# Patient Record
Sex: FEMALE | Race: WHITE | NOT HISPANIC OR LATINO | Employment: STUDENT
[De-identification: names, ages, dates, MRNs, and addresses within clinical notes are randomized per-mention and may not be internally consistent; named-entity substitution may affect disease eponyms.]

---

## 2023-06-04 ENCOUNTER — HEALTH MAINTENANCE LETTER (OUTPATIENT)
Age: 33
End: 2023-06-04

## 2023-06-30 ENCOUNTER — TELEPHONE (OUTPATIENT)
Dept: NURSING | Facility: CLINIC | Age: 33
End: 2023-06-30

## 2023-06-30 NOTE — TELEPHONE ENCOUNTER
Telephone Call for appt    Pt calling to report that she thinks she has a UTI and is asking how she can get an order for an antibiotic. Pt was given appt options for today. Pt declined triage of sx at this time.     Pt was given the [place] [facility] [place] and then was transferred to scheduling to check yeny availability.     Ayla Gunn RN  [facility] Nurse Advisor 1:14 PM 6/30/2023

## 2023-07-03 ENCOUNTER — VIRTUAL VISIT (OUTPATIENT)
Dept: FAMILY MEDICINE | Facility: CLINIC | Age: 33
End: 2023-07-03
Payer: COMMERCIAL

## 2023-07-03 DIAGNOSIS — N39.0 FREQUENT UTI: Primary | ICD-10-CM

## 2023-07-03 DIAGNOSIS — R30.0 DYSURIA: ICD-10-CM

## 2023-07-03 PROCEDURE — 99203 OFFICE O/P NEW LOW 30 MIN: CPT | Mod: VID | Performed by: INTERNAL MEDICINE

## 2023-07-03 RX ORDER — VITAMIN B COMPLEX
1000 TABLET ORAL DAILY
COMMUNITY

## 2023-07-03 RX ORDER — CIPROFLOXACIN 500 MG/1
500 TABLET, FILM COATED ORAL 2 TIMES DAILY
Qty: 10 TABLET | Refills: 1 | Status: SHIPPED | OUTPATIENT
Start: 2023-07-03 | End: 2023-07-08

## 2023-07-03 NOTE — PROGRESS NOTES
Mary Jane is a 32 year old who is being evaluated via a billable video visit.      How would you like to obtain your AVS? MyChart  If the video visit is dropped, the invitation should be resent by: Text to cell phone: [phone]  Will anyone else be joining your video visit? No      Subjective   Mary Jane is a 32 year old, presenting for the following health issues:  Urinary Problem (For 3 days. Last Menstrual Period 06/20/2023) and Health Maintenance (Last Pap Smear done on 05/03/2021 at [facility])      HPI     Chief Complaint:     UTI    HPI:   Patient Kathryn Tarango is a very pleasant 32 year old female with history of frequent UTI's who presents to Internal Medicine clinic today for evaluation recent recurrent dysuria, UTI symptoms.         Current Medications:     Current Outpatient Medications   Medication Sig Dispense Refill     ciprofloxacin (CIPRO) 500 MG tablet Take 1 tablet (500 mg) by mouth 2 times daily for 5 days 10 tablet 1     Vitamin D3 (CHOLECALCIFEROL) 25 mcg (1000 units) tablet Take 1,000 Units by mouth daily (Patient not taking: Reported on 7/3/2023)           Allergies:      Allergies   Allergen Reactions     No Known Allergies             Past Medical History:   No past medical history on file.      Past Surgical History:   No past surgical history on file.      Family Medical History:   No family history on file.      Social History:     Social History     Socioeconomic History     Marital status:      Spouse name: Not on file     Number of children: Not on file     Years of education: Not on file     Highest education level: Not on file   Occupational History     Not on file   Tobacco Use     Smoking status: Never     Smokeless tobacco: Never   Vaping Use     Vaping Use: Never used   Substance and Sexual Activity     Alcohol use: Yes     Comment: occ     Drug use: Never     Sexual activity: Yes     Partners: Male   Other Topics Concern     Not on file   Social  History Narrative     Not on file     Social Determinants of Health     Financial Resource Strain: Not on file   Food Insecurity: Not on file   Transportation Needs: Not on file   Physical Activity: Not on file   Stress: Not on file   Social Connections: Not on file   Intimate Partner Violence: Not on file   Housing Stability: Not on file           Review of System:     Constitutional: Negative for fever or chills  Skin: Negative for rashes  Ears/Nose/Throat: Negative for nasal congestion, sore throat  Respiratory: No shortness of breath, dyspnea on exertion, cough, or hemoptysis  Cardiovascular: Negative for chest pain  Gastrointestinal: Negative for nausea, vomiting  Genitourinary: positive for dysuria  Musculoskeletal: Negative for myalgias  Neurologic: Negative for headaches  Psychiatric: Negative for depression, anxiety  Hematologic/Lymphatic/Immunologic: Negative  Endocrine: Negative  Behavioral: Negative for tobacco use       Physical Exam:   There were no vitals taken for this visit.    GENERAL: alert and no distress  EYES: eyes grossly normal to inspection  HENT: Normocephalic atraumatic.   RESP: no cough present  CV: patient appears to be hemodynamically stable  MS: no gross musculoskeletal defects noted  SKIN: no visible suspicious lesions or rashes  NEURO: Alert & Oriented x 3.   PSYCH: mentation appears normal, affect normal        Diagnostic Test Results:     Diagnostic Test Results:  Labs reviewed in Epic    ASSESSMENT/PLAN:       Kathryn was seen today for urinary problem and health maintenance.    Diagnoses and all orders for this visit:    Frequent UTI  -     REVIEW OF HEALTH MAINTENANCE PROTOCOL ORDERS  -     ciprofloxacin (CIPRO) 500 MG tablet; Take 1 tablet (500 mg) by mouth 2 times daily for 5 days    Dysuria            Follow Up Plan:     Patient is instructed to return to Internal Medicine clinic for follow-up visit in 1 week.        Zoey Chu MD  Internal Medicine  [facility]  [facility]        Video-Visit Details    Type of service:  Video Visit     Originating Location (pt. Location): Home  Distant Location (provider location):  Off-site  Platform used for Video Visit: Yancy

## 2023-07-20 ENCOUNTER — THERAPY VISIT (OUTPATIENT)
Dept: PHYSICAL THERAPY | Facility: CLINIC | Age: 33
End: 2023-07-20
Payer: COMMERCIAL

## 2023-07-20 DIAGNOSIS — M54.50 BILATERAL LOW BACK PAIN WITHOUT SCIATICA: ICD-10-CM

## 2023-07-20 DIAGNOSIS — M62.08 DIASTASIS OF RECTUS ABDOMINIS: Primary | ICD-10-CM

## 2023-07-20 PROCEDURE — 97161 PT EVAL LOW COMPLEX 20 MIN: CPT | Mod: GP

## 2023-07-20 PROCEDURE — 97530 THERAPEUTIC ACTIVITIES: CPT | Mod: GP

## 2023-07-20 PROCEDURE — 97110 THERAPEUTIC EXERCISES: CPT | Mod: GP

## 2023-07-20 NOTE — PROGRESS NOTES
PHYSICAL THERAPY EVALUATION  Type of Visit: Evaluation    See electronic medical record for Abuse and Falls Screening details.    Mary Jane broussard knows she has MARIBEL but has never sought formal help for it. She has tried core repair program through [employer] for a while 1 yr ago- felt stronger but still had separation. Has had LBP in the past but not currently. Notes feelings of pressure in lower abdomen, nito at end of the day.  A couple times a wk is doing some core exercises- abdominal bracing, supine leg lifts, bird dogs, etc. Avoiding crunches. Would like to be able to do more core exercises confidently so she can participate in pilates and barre classes more    Subjective       Presenting condition or subjective complaint: diastasis recti, pain in lower abdomen/pelvic area, weakness  Date of onset: 12/12/21    Relevant medical history:     Dates & types of surgery: two c-sections (6/13/19, 12/12/21)    Prior diagnostic imaging/testing results:       Prior therapy history for the same diagnosis, illness or injury: No        Living Environment  Social support: With a significant other or spouse   Type of home: House   Stairs to enter the home: Yes 2 Is there a railing: No   Ramp: No   Stairs inside the home: Yes 16 Is there a railing: Yes   Help at home: None  Equipment owned:       Employment: Yes PhD student/  Hobbies/Interests: dance, yoga, reading, cooking    Patient goals for therapy: move more confidently, snowboard, certain workouts, lift my kids without lingering pain    Pain assessment: Pain denied     Objective      PELVIC EVALUATION  ADDITIONAL HISTORY:  Sex assigned at birth: Female  Gender identity: Female    Pronouns: She/Her Hers      Bladder History:  Feels bladder filling: No  Triggers for feeling of inability to wait to go to the bathroom: No    How long can you wait to urinate: a few minutes  Gets up at night to urinate: No    Can stop the flow of urine when urinating:  Yes  Volume of urine usually released: Medium   Other issues:    Number of bladder infections in last 12 months:    Fluid intake per day: 50 12 6  Medications taken for bladder: No     Activities causing urine leak: Hurrying to the bathroom due to a strong urge to urinate (pee)    Amount of urine typically leaked: minimal  Pads used to help with leaking: No        Bowel History:  Frequency of bowel movement: daily  Consistency of stool: Soft-formed    Ignores the urge to defecate: No  Other bowel issues:    Length of time spent trying to have a bowel movement:      Sexual Function History:  Sexual orientation: Straight    Sexually active: Yes  Lubrication used: Yes Yes  Pelvic pain: Initial penetration (rectal or vaginal)    Pain or difficulty with orgasms/erection/ejaculation: No    State of menopause: Perimenopause (have not gone through menopause yet)  Hormone medications: No      Are you currently pregnant: No, Number of previous pregnancies: 2, Number of deliveries: 2, If you have delivered before, did you have any of these issues during delivery:  delivery, Have you been diagnosed with pelvic prolapse or abdominal separation: Yes, Do you get regular exercise: Yes, I do this type of exercise: dance, barre, yoga, Have you tried pelvic floor strengthening exercises for 4 weeks: Yes, Do you have any history of trauma that is relevant to your care that you d like to share: No    Discussed reason for referral regarding pelvic health needs and external/internal pelvic floor muscle examination with patient/guardian.  Opportunity provided to ask questions and verbal consent for assessment and intervention was given.    PAIN: Pain Location: lower anterior abdomen  Pain Quality: Aching  Pain is Worst: end of day after doing a lot  POSTURE: Standing Posture: Sway back  LUMBAR SCREEN: AROM WNL  HIP SCREEN:  Strength:   Pain: - none + mild ++ moderate +++ severe  Strength Scale: 0-5/5 Left Right   Hip Flexion 4 4    Hip Extension 4 4   Hip Abduction 4 4   Hip Adduction     Hip Internal Rotation 4 4   Hip External Rotation 4 4   Functional Strength Testing: Double Leg Squat: Good technique/no significant findings  Single Leg Squat: Anterior knee translation, Knee valgus, Hip internal rotation and Improper use of glutes/hips    PELVIC/SI SCREEN:  WNL   BREATHING SYMMETRY: upper chest breathing pattern w/ dec rib and abdominal expansion    PELVIC EXAM:   External Visual Inspection: not indicated today based on pt subjective and screening questions    Integumentary:     External Digital Palpation per Perineum:       Internal Digital Palpation:  not indicated today based on pt subjective and screening questions  Per Vagina:    Per Rectum:        Pelvic Organ Prolapse:       ABDOMINAL ASSESSMENT  Diastasis Rectus Abdominis (MARIBEL):  MARIBEL presence: Yes  Location   Above Umbilicus Depth/Finger width: 3 cm, minimal depth   At Umbilicus Depth/Finger width: 2.5-3 cm, mod depth   Below Umbilicus Depth/Finger width: 3 cm, fingers sink downwards to level of nail bed    Abdominal Activation/Strength: [facility] lower abdominals level 1A. No doming noted but cannot keep lumbar spine stable  Scar:   Location/Type:   Mobility: Hypomobile, no pain    Fascial Tension/Restriction/Tone: WNL      Assessment & Plan   CLINICAL IMPRESSIONS  Medical Diagnosis:      Treatment Diagnosis: MARIBEL   Impression/Assessment: Patient is a 32 year old female with abdominal complaints.  The following significant findings have been identified: Pain, Decreased strength, Impaired muscle performance, Decreased activity tolerance and Impaired posture. These impairments interfere with their ability to perform self care tasks, work tasks, recreational activities and household chores as compared to previous level of function.     Clinical Decision Making (Complexity):  Clinical Presentation: Stable/Uncomplicated  Clinical Presentation Rationale: based on medical and  personal factors listed in PT evaluation  Clinical Decision Making (Complexity): Low complexity    PLAN OF CARE  Treatment Interventions:  Modalities: Biofeedback, Contrast Bath, Cryotherapy, Dry Needling, Fluidotherapy, E-stim, Hot Pack, Infrared, Iontophoresis, Laser Light, Mechanical Traction, Paraffin, Ultrasound, Vasoneumatic Device  Interventions: Gait Training, Manual Therapy, Neuromuscular Re-education, Therapeutic Activity, Therapeutic Exercise, Self-Care/Home Management    Long Term Goals     PT Goal 1  Goal Identifier: Strength  Goal Description: Pt will demonstrate lower abdominal strength of at least 3/5 (Sahrmann level) in order to have adequate abdominal support for childcare and sport activities to reduce risk of low back injury  Rationale: to maximize safety and independence with performance of ADLs and functional tasks;to maximize safety and independence with self cares  Target Date: 10/12/23      Frequency of Treatment: 2x/mo  Duration of Treatment: 3 mo    Recommended Referrals to Other Professionals: none  Education Assessment:   Learner/Method: Patient;Listening;Demonstration;Pictures/Video;No Barriers to Learning    Risks and benefits of evaluation/treatment have been explained.   Patient/Family/caregiver agrees with Plan of Care.     Evaluation Time:     PT Eval, Low Complexity Minutes (50254): 12   Present: Not applicable     Signing Clinician: MIGUELANGEL GODFREY, PT

## 2023-08-02 NOTE — PROGRESS NOTES
Kathryn is a 33 year old No obstetric history on file. female who presents for annual exam.     Besides routine health maintenance, she has no other health concerns today .    HPI:  Mary Jane is here for her annual exam. She recently moved from [place] and is looking to establish care in [place]. 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 Normal cycles,  has a vasectomy for birth control.    She has initially reached out regarding concerns for her pelvic floor but since making this appointment she has been able to be seen by pelvic floor PT.     The patient's PCP is  No primary care provider on file..        GYNECOLOGIC HISTORY:    Patient's last menstrual period was 2023 (exact date).    Regular menses? yes  Menses every 28 days.  Length of menses: 4 days    Her current contraception method is: none.  She  reports that she has never smoked. She has never used smokeless tobacco.  Patient is sexually active.  STD testing offered?  Declined  Last PHQ-9 score on record =       8/3/2023     1:47 PM   PHQ-9 SCORE   PHQ-9 Total Score 2     Last GAD7 score on record =       8/3/2023     1:47 PM   JONATAN-7 SCORE   Total Score 2         HEALTH MAINTENANCE:  Pap: NIL HPV- 5/3/21 [facility] Found in Care Everywhere    Health maintenance updated:  yes    HISTORY:  OB History    Para Term  AB Living   2 2 2 0 0 2   SAB IAB Ectopic Multiple Live Births   0 0 0 0 2      # Outcome Date GA Lbr Chi/2nd Weight Sex Delivery Anes PTL Lv   2 Term 21 39w4d  3.92 kg (8 lb 10.3 oz) M CS-LTranv Spinal N NAVJOT      Name: KYRA KATHLEEN      Apgar1: 8  Apgar5: 9   1 Term 19 40w2d  4.17 kg (9 lb 3.1 oz) M CS-LTranv EPI N NAVJOT      Complications: Dysfunctional Labor, Failure to Progress in First Stage      Name: HEVERBOY KATHRYN      Apgar1: 9  Apgar5: 9       Patient Active Problem List   Diagnosis    Bilateral low back pain without sciatica    Diastasis of rectus abdominis     History reviewed.  "No pertinent surgical history.   Social History     Tobacco Use    Smoking status: Never    Smokeless tobacco: Never   Substance Use Topics    Alcohol use: Yes     Comment: occ      Problem (# of Occurrences) Relation (Name,Age of Onset)    Osteoporosis (1) Mother              Current Outpatient Medications   Medication Sig    Vitamin D3 (CHOLECALCIFEROL) 25 mcg (1000 units) tablet Take 1,000 Units by mouth daily (Patient not taking: Reported on 7/3/2023)     No current facility-administered medications for this visit.     Allergies   Allergen Reactions    No Known Allergies        Past medical, surgical, social and family histories were reviewed and updated in EPIC.    ROS:   12 point review of systems negative other than symptoms noted below or in the HPI.  No urinary frequency or dysuria, bladder or kidney problems, Normal menstrual cycles    EXAM:  /62   Ht 1.62 m (5' 3.78\")   Wt 57.7 kg (127 lb 3.2 oz)   LMP 07/17/2023 (Exact Date)   BMI 21.98 kg/m     BMI: Body mass index is 21.98 kg/m .    PHYSICAL EXAM:  Constitutional:   Appearance: Well nourished, well developed, alert, in no acute distress  Neck:  Lymph Nodes:  No lymphadenopathy present    Thyroid:  Gland size normal, nontender, no nodules or masses present  on palpation  Chest:  Respiratory Effort:  Breathing unlabored  Cardiovascular:    Heart: Auscultation:  Regular rate, normal rhythm, no murmurs present  Breasts: Inspection of Breasts:  No lymphadenopathy present., Palpation of Breasts and Axillae:  No masses present on palpation, no breast tenderness., Axillary Lymph Nodes:  No lymphadenopathy present., and No nodularity, asymmetry or nipple discharge bilaterally.  Gastrointestinal:   Abdominal Examination:  Abdomen nontender to palpation, tone normal without rigidity or guarding, no masses present, umbilicus without lesions   Liver and Spleen:  No hepatomegaly present, liver nontender to palpation    Hernias:  No hernias " present  Lymphatic: Lymph Nodes:  No other lymphadenopathy present  Skin:  General Inspection:  No rashes present, no lesions present, no areas of  discoloration  Neurologic:    Mental Status:  Oriented X3.  Normal strength and tone, sensory exam                grossly normal, mentation intact and speech normal.    Psychiatric:   Mentation appears normal and affect normal/bright.         Pelvic Exam:  External Genitalia:     Normal appearance for age, no discharge present, no tenderness present, no inflammatory lesions present, color normal  Vagina:     Normal vaginal vault without central or paravaginal defects, no discharge present, no inflammatory lesions present, no masses present  Bladder:     Nontender to palpation  Urethra:   Urethral Body:  Urethra palpation normal, urethra structural support normal   Urethral Meatus:  No erythema or lesions present  Cervix:     Appearance healthy, no lesions present, nontender to palpation, no bleeding present  Uterus:     Uterus: firm, normal sized and nontender, anteverted in position.   Adnexa:     No adnexal tenderness present, no adnexal masses present  Perineum:     Perineum within normal limits, no evidence of trauma, no rashes or skin lesions present  Anus:     Anus within normal limits, no hemorrhoids present  Inguinal Lymph Nodes:     No lymphadenopathy present  Pubic Hair:     Normal pubic hair distribution for age  Genitalia and Groin:     No rashes present, no lesions present, no areas of discoloration, no masses present    COUNSELING:   Reviewed preventive health counseling, as reflected in patient instructions       Regular exercise       Healthy diet/nutrition       Contraception    BMI: Body mass index is 21.98 kg/m .      ASSESSMENT:  33 year old female with satisfactory annual exam.    ICD-10-CM    1. Encounter for gynecological examination without abnormal finding  Z01.419       2. Screening for ischemic heart disease  Z13.6 Lipid Profile      3.  Screening for diabetes mellitus  Z13.1 Comprehensive metabolic panel          PLAN:  Normal GYN exam. Pap not due.   Plan for fasting lab work - order placed.     RTC in 1 year or PRN.     Raegan Castro, [facility]    Provider Disclosure:  I was present with the [facility] student who participated in the service and in the documentation of the services provided. I have verified the history and personally performed the physical exam and medical decision-making, as documented by the student and edited by me.     I was present with the student who participated in the service and in the documentation of the note. I have verified the history and medical decision-making.  Student participated in the annual exam and I can attest to being personally present for the entire exam. I agree with the assessment and plan of care as documented in the note. MANDIE Powers CNP, APRN CNP

## 2023-08-03 ENCOUNTER — OFFICE VISIT (OUTPATIENT)
Dept: OBGYN | Facility: CLINIC | Age: 33
End: 2023-08-03
Payer: COMMERCIAL

## 2023-08-03 VITALS
DIASTOLIC BLOOD PRESSURE: 62 MMHG | SYSTOLIC BLOOD PRESSURE: 102 MMHG | BODY MASS INDEX: 21.72 KG/M2 | HEIGHT: 64 IN | WEIGHT: 127.2 LBS

## 2023-08-03 DIAGNOSIS — Z01.419 ENCOUNTER FOR GYNECOLOGICAL EXAMINATION WITHOUT ABNORMAL FINDING: Primary | ICD-10-CM

## 2023-08-03 DIAGNOSIS — Z13.6 SCREENING FOR ISCHEMIC HEART DISEASE: ICD-10-CM

## 2023-08-03 DIAGNOSIS — Z13.1 SCREENING FOR DIABETES MELLITUS: ICD-10-CM

## 2023-08-03 PROCEDURE — 99385 PREV VISIT NEW AGE 18-39: CPT | Performed by: NURSE PRACTITIONER

## 2023-08-03 ASSESSMENT — PATIENT HEALTH QUESTIONNAIRE - PHQ9
5. POOR APPETITE OR OVEREATING: NOT AT ALL
SUM OF ALL RESPONSES TO PHQ QUESTIONS 1-9: 2

## 2023-08-03 ASSESSMENT — ANXIETY QUESTIONNAIRES
7. FEELING AFRAID AS IF SOMETHING AWFUL MIGHT HAPPEN: SEVERAL DAYS
5. BEING SO RESTLESS THAT IT IS HARD TO SIT STILL: NOT AT ALL
GAD7 TOTAL SCORE: 2
2. NOT BEING ABLE TO STOP OR CONTROL WORRYING: NOT AT ALL
3. WORRYING TOO MUCH ABOUT DIFFERENT THINGS: NOT AT ALL
1. FEELING NERVOUS, ANXIOUS, OR ON EDGE: NOT AT ALL
GAD7 TOTAL SCORE: 2
6. BECOMING EASILY ANNOYED OR IRRITABLE: SEVERAL DAYS
IF YOU CHECKED OFF ANY PROBLEMS ON THIS QUESTIONNAIRE, HOW DIFFICULT HAVE THESE PROBLEMS MADE IT FOR YOU TO DO YOUR WORK, TAKE CARE OF THINGS AT HOME, OR GET ALONG WITH OTHER PEOPLE: NOT DIFFICULT AT ALL

## 2023-08-04 ENCOUNTER — THERAPY VISIT (OUTPATIENT)
Dept: PHYSICAL THERAPY | Facility: CLINIC | Age: 33
End: 2023-08-04
Payer: COMMERCIAL

## 2023-08-04 DIAGNOSIS — M54.50 BILATERAL LOW BACK PAIN WITHOUT SCIATICA, UNSPECIFIED CHRONICITY: Primary | ICD-10-CM

## 2023-08-04 DIAGNOSIS — M62.08 DIASTASIS OF RECTUS ABDOMINIS: ICD-10-CM

## 2023-08-04 PROCEDURE — 97530 THERAPEUTIC ACTIVITIES: CPT | Mod: GP

## 2023-08-04 PROCEDURE — 97110 THERAPEUTIC EXERCISES: CPT | Mod: 59

## 2023-08-31 ENCOUNTER — THERAPY VISIT (OUTPATIENT)
Dept: PHYSICAL THERAPY | Facility: CLINIC | Age: 33
End: 2023-08-31
Payer: COMMERCIAL

## 2023-08-31 DIAGNOSIS — M62.08 DIASTASIS OF RECTUS ABDOMINIS: ICD-10-CM

## 2023-08-31 DIAGNOSIS — M54.50 BILATERAL LOW BACK PAIN WITHOUT SCIATICA, UNSPECIFIED CHRONICITY: Primary | ICD-10-CM

## 2023-08-31 PROCEDURE — 97530 THERAPEUTIC ACTIVITIES: CPT | Mod: GP

## 2023-08-31 PROCEDURE — 97110 THERAPEUTIC EXERCISES: CPT | Mod: 59

## 2023-10-31 ENCOUNTER — E-VISIT (OUTPATIENT)
Dept: FAMILY MEDICINE | Facility: CLINIC | Age: 33
End: 2023-10-31
Payer: COMMERCIAL

## 2023-10-31 DIAGNOSIS — N39.0 ACUTE UTI (URINARY TRACT INFECTION): Primary | ICD-10-CM

## 2023-10-31 PROCEDURE — 99421 OL DIG E/M SVC 5-10 MIN: CPT | Performed by: PHYSICIAN ASSISTANT

## 2023-10-31 RX ORDER — NITROFURANTOIN 25; 75 MG/1; MG/1
100 CAPSULE ORAL 2 TIMES DAILY
Qty: 10 CAPSULE | Refills: 0 | Status: SHIPPED | OUTPATIENT
Start: 2023-10-31 | End: 2023-11-05

## 2023-10-31 NOTE — PATIENT INSTRUCTIONS
Dear Kathryn Tarango    After reviewing your responses, I've been able to diagnose you with a urinary tract infection, which is a common infection of the bladder with bacteria.  This is not a sexually transmitted infection, though urinating immediately after intercourse can help prevent infections.  Drinking lots of fluids is also helpful to clear your current infection and prevent the next one.      I have sent a prescription for antibiotics to your pharmacy to treat this infection.    It is important that you take all of your prescribed medication even if your symptoms are improving after a few doses.  Taking all of your medicine helps prevent the symptoms from returning.     If your symptoms worsen, you develop pain in your back or stomach, develop fevers, or are not improving in 5 days, please contact your primary care provider for an appointment or visit any of our convenient Walk-in or Urgent Care Centers to be seen, which can be found on our website here.    Thanks again for choosing us as your health care partner,    Minerva Kemp PA-C

## 2023-11-09 PROBLEM — M62.08 DIASTASIS OF RECTUS ABDOMINIS: Status: RESOLVED | Noted: 2023-07-20 | Resolved: 2023-11-09

## 2023-11-09 PROBLEM — M54.50 BILATERAL LOW BACK PAIN WITHOUT SCIATICA: Status: RESOLVED | Noted: 2023-07-20 | Resolved: 2023-11-09

## 2023-11-09 NOTE — PROGRESS NOTES
DISCHARGE  Reason for Discharge: Patient has failed to schedule further appointments.    Equipment Issued: none    Discharge Plan: Patient to continue home program.    Referring Provider:  Referred Self     08/31/23 0500   Appointment Info   Signing clinician's name / credentials Ashley Garza, PT, DPT, WCS   Total/Authorized Visits 6 per PT   Visits Used 3   PT Tx Diagnosis DRA Levine Adds Self Referred   Self Referred   Self Referred (PT) Yes   MD order needed by: 10/18/2023   Progress Note/Certification   Start of Care Date 07/20/23   Onset of illness/injury or Date of Surgery 12/12/21   Therapy Frequency 2x/mo   Predicted Duration 3 mo   Progress Note Due Date 10/18/23   Progress Note Completed Date 07/20/23   PT Goal 1   Goal Identifier Strength   Goal Description Pt will demonstrate lower abdominal strength of at least 3/5 (Sahrmann level) in order to have adequate 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 Requires one more visit to assess progress and function   Target Date 10/12/23   Subjective Report   Subjective Report Pt returns reporting she is doing okay. States she had a break from exercises during previous week, previously exercising daily. Reports lower abdominal pinpoint pressure for 1 day that resolved. No back pain complaints. Returned to [employer] classes, no pain or restrictions. Continues to brace before lifting and has to think about it.   Objective Measures   Objective Measures Objective Measure 1;Objective Measure 2;Objective Measure 3;Objective Measure 4   Objective Measure 1   Objective Measure Strength   Details Sahrmann level 4 lower abdominals   Objective Measure 2   Objective Measure   "  Details 1.5-2cm at and below umbilicus, has some depth to separation below umbilicus but able to generate tension w/ cues   Objective Measure 3   Objective Measure Squat form   Details no issues, good form and abdominal brace   Objective Measure 4   Objective Measure Planks   Details high plank x60\", side plank from forearms x30\" each way   Treatment Interventions (PT)   Interventions Therapeutic Procedure/Exercise;Therapeutic Activity   Therapeutic Procedure/Exercise   Therapeutic Procedures: strength, endurance, ROM, flexibillity minutes (62803) 25   PTRx Ther Proc 2 birddog   PTRx Ther Proc 2 - Details verbal rev   PTRx Ther Proc 3 Prone Plank   PTRx Ther Proc 3 - Details x60\" static and x30\" w/ alternating arm lift. VC to maintain static trunk.    PTRx Ther Proc 4 Side Plank   PTRx Ther Proc 4 - Details x30\" each side with leg lift/lower.    PTRx Ther Proc 5 All 4s Arm Lift   PTRx Ther Proc 6 Cable Fly Unilateral PNF   PTRx Ther Proc 7 Cable Straight Arm Lat Pull    PTRx Ther Proc 8 Shoulder Theraband Low Row/Pulldown   PTRx Ther Proc 9 Diastasis Recti Abdominis Oblique Exercises with Band   PTRx Ther Proc 10 Pallof Press   PTRx Ther Proc 11 Squat   PTRx Ther Proc 11 - Details 1x10 with 8lb weights at shoulder. Palpation of abdomen to ensure TA activation.    PTRx Ther Proc 12 [employer] P[facility]   PTRx Ther Proc 12 - Details 1x8 with VC for form and speed   PTRx Ther Proc 13 Wall Sit   PTRx Ther Proc 13 - Details 1x60\". VC for form    Therapeutic Activity   Therapeutic Activities: dynamic activities to improve functional performance minutes (74235) 10   Therapeutic Activities Ther Act 2   Ther Act 1 pilates   Ther Act 1 - Details Discussion on starting pilates classes, core strength throughout course, modifications to difficult exercises when necessary. Recommend continuation of classes at [facility] discreion   Ther Act 2 POC   Ther Act 2 - Details Discussed current status, functional limitations, and future POC. " Plan to revisit in 1 mo and discuss potential d/c at this time   PTRx Ther Act 1 Abdominal Scar Tissue Desensitization   Patient Response/Progress No adverse effects. Pt verbalizes understanding and all questions answered   PTRx Ther Act 2 Neutral Spine Standing   PTRx Ther Act 2 - Details Explained and performed neutral posture with emphasis on forward weight shift.    Education   Learner/Method Patient;Listening;Demonstration;Pictures/Video;No Barriers to Learning   Plan   Home program printed   Plan for next session consider d/c next time?   Total Session Time   Timed Code Treatment Minutes 35   Total Treatment Time (sum of timed and untimed services) 35

## 2024-01-08 ENCOUNTER — E-VISIT (OUTPATIENT)
Dept: URGENT CARE | Facility: CLINIC | Age: 34
End: 2024-01-08
Payer: COMMERCIAL

## 2024-01-08 DIAGNOSIS — N30.90 CYSTITIS: ICD-10-CM

## 2024-01-08 DIAGNOSIS — N39.0 ACUTE UTI (URINARY TRACT INFECTION): ICD-10-CM

## 2024-01-08 PROCEDURE — 99421 OL DIG E/M SVC 5-10 MIN: CPT | Performed by: PHYSICIAN ASSISTANT

## 2024-01-08 RX ORDER — SULFAMETHOXAZOLE/TRIMETHOPRIM 800-160 MG
1 TABLET ORAL 2 TIMES DAILY
Qty: 6 TABLET | Refills: 0 | Status: SHIPPED | OUTPATIENT
Start: 2024-01-08 | End: 2024-01-11

## 2024-01-09 NOTE — PATIENT INSTRUCTIONS
Dear Kathryn Tarango    After reviewing your responses, I've been able to diagnose you with a urinary tract infection, which is a common infection of the bladder with bacteria.  This is not a sexually transmitted infection, though urinating immediately after intercourse can help prevent infections.  Drinking lots of fluids is also helpful to clear your current infection and prevent the next one.      I have sent a prescription for antibiotics to your pharmacy to treat this infection.    It is important that you take all of your prescribed medication even if your symptoms are improving after a few doses.  Taking all of your medicine helps prevent the symptoms from returning.     If your symptoms worsen, you develop pain in your back or stomach, develop fevers, or are not improving in 5 days, please contact your primary care provider for an appointment or visit any of our convenient Walk-in or Urgent Care Centers to be seen, which can be found on our website here.    Thanks again for choosing us as your health care partner,    Irvin Pulido PA-C

## 2024-01-16 ENCOUNTER — VIRTUAL VISIT (OUTPATIENT)
Dept: URGENT CARE | Facility: CLINIC | Age: 34
End: 2024-01-16
Payer: COMMERCIAL

## 2024-01-16 DIAGNOSIS — R30.0 DYSURIA: Primary | ICD-10-CM

## 2024-01-16 PROCEDURE — 99213 OFFICE O/P EST LOW 20 MIN: CPT | Mod: 95

## 2024-01-16 NOTE — PROGRESS NOTES
Subjective   HPI  UTi symptoms a week ago  Bactrim  Felt better   But then 2 days ago started having vaginal symptoms   Vaginal irritation  This morning had feeling needing to urinate    But now feeling better  No fevers vomiting back pain or abdominal pain  Patient denies any possibility of STD and declined STD testing     Patient Active Problem List   Diagnosis   (none) - all problems resolved or deleted     No past surgical history on file.    Social History     Tobacco Use    Smoking status: Never    Smokeless tobacco: Never   Substance Use Topics    Alcohol use: Yes     Comment: occ     Family History   Problem Relation Age of Onset    Osteoporosis Mother            Reviewed and updated as needed this visit by Provider    Allergies               Review of Systems   Constitutional, HEENT, cardiovascular, pulmonary, GI, , musculoskeletal, neuro, skin, endocrine and psych systems are negative, except as otherwise noted.       Objective   Reported vitals:  There were no vitals taken for this visit.   healthy, alert, and no distress  PSYCH: Alert and oriented times 3; coherent speech, normal   rate and volume, able to articulate logical thoughts, able   to abstract reason, no tangential thoughts, no hallucinations   or delusions  Her affect is normal  RESP: No cough, no audible wheezing, able to talk in full sentences  Additional exam:  none  Remainder of exam unable to be completed due to telephone visits    Diagnostic Test Results:  Labs reviewed in Epic        Assessment/Plan:      ICD-10-CM    1. Dysuria  R30.0 UA Macroscopic with reflex to Microscopic and Culture - Lab Collect     Wet prep - lab collect        Symptoms resolved however ordered future labs good for a month in case symptoms recur  Aware to come back if worsening symptoms or no relief. Come in asap if with fevers nausea vomiting or flank pain.      Joined the call at 1/16/2024, 3:00:22 pm.  Left the call at 1/16/2024, 3:03:40 pm.  You were on  the call for 3 minutes 18 seconds .  Video done via:  [employer]  Originating site: patient home   Provider location: provider home     Alyson Lee MD

## 2024-09-17 ENCOUNTER — LAB (OUTPATIENT)
Dept: LAB | Facility: CLINIC | Age: 34
End: 2024-09-17
Attending: EMERGENCY MEDICINE
Payer: COMMERCIAL

## 2024-09-17 ENCOUNTER — E-VISIT (OUTPATIENT)
Dept: URGENT CARE | Facility: CLINIC | Age: 34
End: 2024-09-17
Payer: COMMERCIAL

## 2024-09-17 DIAGNOSIS — J02.9 SORE THROAT: Primary | ICD-10-CM

## 2024-09-17 DIAGNOSIS — J02.9 SORE THROAT: ICD-10-CM

## 2024-09-17 LAB
DEPRECATED S PYO AG THROAT QL EIA: NEGATIVE
GROUP A STREP BY PCR: NOT DETECTED

## 2024-09-17 PROCEDURE — 99421 OL DIG E/M SVC 5-10 MIN: CPT | Performed by: EMERGENCY MEDICINE

## 2024-09-17 PROCEDURE — 87651 STREP A DNA AMP PROBE: CPT

## 2024-09-17 NOTE — PATIENT INSTRUCTIONS
Dear Mary Jane,    After reviewing your responses, I would like you to come in for a swab to make sure we treat you correctly. This swab is to evaluate you for possible Strep Throat, and should be scheduled for today or tomorrow. Please use the Schedule Now button in [employer] to schedule your swab. Otherwise, click this link to schedule a lab only appointment.    Lab appointments are not available at most locations on the weekends. If no Lab Only appointment is available, you should be seen in any of our convenient Urgent Care Centers for an in person visit, which can be found on our website here.    You will receive instructions with your results in [employer] once they are available.     If your symptoms worsen, you develop difficulty breathing, difficulty with drinking enough to stay hydrated, difficulty swallowing your saliva or have fevers for more than 5 days, please contact your primary care provider for an appointment or visit an Urgent Care Center to be seen.      Thanks again for choosing us as your health care partner.   Alvin Feliciano MD  Sore Throat: Care Instructions  Overview     Infection by bacteria or a virus causes most sore throats. Cigarette smoke, dry air, air pollution, allergies, and yelling can also cause a sore throat. Sore throats can be painful and annoying. Fortunately, most sore throats go away on their own. If you have a bacterial infection, your doctor may prescribe antibiotics.  Follow-up care is a key part of your treatment and safety. Be sure to make and go to all appointments, and call your doctor if you are having problems. It's also a good idea to know your test results and keep a list of the medicines you take.  How can you care for yourself at home?  If your doctor prescribed antibiotics, take them as directed. Do not stop taking them just because you feel better. You need to take the full course of antibiotics.  Gargle with warm salt water several times a day to help reduce  "swelling and relieve pain. Mix 1/2 teaspoon of salt in 1 cup of warm water.  Take an over-the-counter pain medicine, such as acetaminophen (Tylenol), ibuprofen (Advil, Motrin), or naproxen (Aleve). Read and follow all instructions on the label.  Be careful when taking over-the-counter cold or flu medicines and Tylenol at the same time. Many of these medicines have acetaminophen, which is Tylenol. Read the labels to make sure that you are not taking more than the recommended dose. Too much acetaminophen (Tylenol) can be harmful.  Drink plenty of fluids. Fluids may help soothe an irritated throat. Hot fluids, such as tea or soup, may help decrease throat pain.  Use over-the-counter throat lozenges to soothe pain. Regular cough drops or hard candy may also help. These should not be given to young children because of the risk of choking.  Do not smoke or allow others to smoke around you. If you need help quitting, talk to your doctor about stop-smoking programs and medicines. These can increase your chances of quitting for good.  Use a vaporizer or humidifier to add moisture to your bedroom. Follow the directions for cleaning the machine.  When should you call for help?   Call your doctor now or seek immediate medical care if:    You have trouble breathing.     Your sore throat gets much worse on one side.     You have new or worse trouble swallowing.     You have a new or higher fever.   Watch closely for changes in your health, and be sure to contact your doctor if you do not get better as expected.  Where can you learn more?  Go to https://www.[employer].net/patiented  Enter U420 in the search box to learn more about \"Sore Throat: Care Instructions.\"  Current as of: September 27, 2023               Content Version: 14.0    6131-6622 [employer].   Care instructions adapted under license by your healthcare professional. If you have questions about a medical condition or this instruction, always ask your " healthcare professional. [employer] disclaims any warranty or liability for your use of this information.

## 2024-10-06 ENCOUNTER — HEALTH MAINTENANCE LETTER (OUTPATIENT)
Age: 34
End: 2024-10-06

## 2024-11-16 ENCOUNTER — NURSE TRIAGE (OUTPATIENT)
Dept: NURSING | Facility: CLINIC | Age: 34
End: 2024-11-16

## 2024-11-16 ENCOUNTER — E-VISIT (OUTPATIENT)
Dept: URGENT CARE | Facility: CLINIC | Age: 34
End: 2024-11-16
Payer: COMMERCIAL

## 2024-11-16 DIAGNOSIS — R30.0 DYSURIA: Primary | ICD-10-CM

## 2024-11-16 PROCEDURE — 99207 PR NON-BILLABLE SERV PER CHARTING: CPT | Performed by: NURSE PRACTITIONER

## 2024-11-16 NOTE — PATIENT INSTRUCTIONS
Dear Kathryn Tarango,     After reviewing your responses, I would like you to come in for a urine test to make sure we treat you correctly. This urine test is to evaluate you for a possible urinary tract infection, and should be scheduled for today or tomorrow. Schedule a Lab Only appointment here.     Lab appointments are not available at most locations on the weekends. If no Lab Only appointment is available, you should be seen in any of our convenient Walk-in or Urgent Care Centers, which can be found on our website here.     You will receive instructions with your results in "[employer]" once they are available.     If your symptoms worsen, you develop pain in your back or stomach, develop fevers, or are not improving in 5 days, please contact your primary care provider for an appointment or visit a Walk-in or Urgent Care Center to be seen.     Thanks again for choosing us as your health care partner,     MANDIE Hopkins CNP

## 2024-11-16 NOTE — PATIENT INSTRUCTIONS
You are getting to the person who ordered the urine test originally   For us to treat we need to know there is an infection.   So the order for checking the urine is in place for you to do.  Once that is done, if positive, we would treat

## 2024-11-16 NOTE — TELEPHONE ENCOUNTER
Pt calls [facility] after virtual visit. Pt was told to leave a urine sample for testing for UTI. Pt is certain she has UTI and was previously given ABX without labwork back in January.    Writer explained limitations of [facility] in this situation, emphasized that provider today needed a UA/UC for treatment, even if previous providers may not have needed that same testing. Pt is frustrated but understands limitations. No further questions.     Reason for Disposition   Health Information question, no triage required and triager able to answer question    Protocols used: Information Only Call - No Triage-A-

## 2024-11-18 ENCOUNTER — LAB (OUTPATIENT)
Dept: LAB | Facility: CLINIC | Age: 34
End: 2024-11-18
Payer: COMMERCIAL

## 2024-11-18 DIAGNOSIS — N30.00 ACUTE CYSTITIS WITHOUT HEMATURIA: Primary | ICD-10-CM

## 2024-11-18 DIAGNOSIS — R30.0 DYSURIA: ICD-10-CM

## 2024-11-18 LAB
ALBUMIN UR-MCNC: NEGATIVE MG/DL
APPEARANCE UR: CLEAR
BACTERIA #/AREA URNS HPF: ABNORMAL /HPF
BILIRUB UR QL STRIP: NEGATIVE
COLOR UR AUTO: YELLOW
GLUCOSE UR STRIP-MCNC: NEGATIVE MG/DL
HGB UR QL STRIP: NEGATIVE
KETONES UR STRIP-MCNC: NEGATIVE MG/DL
LEUKOCYTE ESTERASE UR QL STRIP: NEGATIVE
NITRATE UR QL: POSITIVE
PH UR STRIP: 7 [PH] (ref 5–7)
RBC #/AREA URNS AUTO: ABNORMAL /HPF
SP GR UR STRIP: 1.01 (ref 1–1.03)
SQUAMOUS #/AREA URNS AUTO: ABNORMAL /LPF
UROBILINOGEN UR STRIP-ACNC: 0.2 E.U./DL
WBC #/AREA URNS AUTO: ABNORMAL /HPF

## 2024-11-18 PROCEDURE — 87088 URINE BACTERIA CULTURE: CPT

## 2024-11-18 PROCEDURE — 81001 URINALYSIS AUTO W/SCOPE: CPT

## 2024-11-18 PROCEDURE — 87086 URINE CULTURE/COLONY COUNT: CPT

## 2024-11-18 RX ORDER — SULFAMETHOXAZOLE AND TRIMETHOPRIM 800; 160 MG/1; MG/1
1 TABLET ORAL 2 TIMES DAILY
Qty: 6 TABLET | Refills: 0 | Status: SHIPPED | OUTPATIENT
Start: 2024-11-18 | End: 2024-11-21

## 2024-11-19 LAB — BACTERIA UR CULT: ABNORMAL

## 2024-11-19 RX ORDER — AMOXICILLIN 875 MG/1
875 TABLET, COATED ORAL 2 TIMES DAILY
Qty: 14 TABLET | Refills: 0 | Status: SHIPPED | OUTPATIENT
Start: 2024-11-19 | End: 2024-11-26

## 2024-12-16 ENCOUNTER — OFFICE VISIT (OUTPATIENT)
Dept: FAMILY MEDICINE | Facility: CLINIC | Age: 34
End: 2024-12-16
Payer: COMMERCIAL

## 2024-12-16 VITALS
OXYGEN SATURATION: 98 % | WEIGHT: 124 LBS | TEMPERATURE: 96.9 F | BODY MASS INDEX: 21.43 KG/M2 | RESPIRATION RATE: 19 BRPM | DIASTOLIC BLOOD PRESSURE: 62 MMHG | SYSTOLIC BLOOD PRESSURE: 104 MMHG | HEART RATE: 74 BPM

## 2024-12-16 DIAGNOSIS — Z11.4 SCREENING FOR HIV (HUMAN IMMUNODEFICIENCY VIRUS): ICD-10-CM

## 2024-12-16 DIAGNOSIS — R53.83 FATIGUE, UNSPECIFIED TYPE: ICD-10-CM

## 2024-12-16 DIAGNOSIS — Z12.4 CERVICAL CANCER SCREENING: Primary | ICD-10-CM

## 2024-12-16 DIAGNOSIS — Z00.00 WELL ADULT EXAM: ICD-10-CM

## 2024-12-16 DIAGNOSIS — Z11.59 NEED FOR HEPATITIS C SCREENING TEST: ICD-10-CM

## 2024-12-16 LAB
ALBUMIN SERPL BCG-MCNC: 4.2 G/DL (ref 3.5–5.2)
ALP SERPL-CCNC: 42 U/L (ref 40–150)
ALT SERPL W P-5'-P-CCNC: 6 U/L (ref 0–50)
ANION GAP SERPL CALCULATED.3IONS-SCNC: 8 MMOL/L (ref 7–15)
AST SERPL W P-5'-P-CCNC: 20 U/L (ref 0–45)
BILIRUB SERPL-MCNC: 0.3 MG/DL
BUN SERPL-MCNC: 11.2 MG/DL (ref 6–20)
CALCIUM SERPL-MCNC: 9.6 MG/DL (ref 8.8–10.4)
CHLORIDE SERPL-SCNC: 105 MMOL/L (ref 98–107)
CHOLEST SERPL-MCNC: 168 MG/DL
CREAT SERPL-MCNC: 0.72 MG/DL (ref 0.51–0.95)
EGFRCR SERPLBLD CKD-EPI 2021: >90 ML/MIN/1.73M2
FASTING STATUS PATIENT QL REPORTED: NO
FASTING STATUS PATIENT QL REPORTED: NO
FERRITIN SERPL-MCNC: 16 NG/ML (ref 6–175)
GLUCOSE SERPL-MCNC: 93 MG/DL (ref 70–99)
HCO3 SERPL-SCNC: 25 MMOL/L (ref 22–29)
HDLC SERPL-MCNC: 86 MG/DL
LDLC SERPL CALC-MCNC: 72 MG/DL
NONHDLC SERPL-MCNC: 82 MG/DL
POTASSIUM SERPL-SCNC: 5.1 MMOL/L (ref 3.4–5.3)
PROT SERPL-MCNC: 7.2 G/DL (ref 6.4–8.3)
SODIUM SERPL-SCNC: 138 MMOL/L (ref 135–145)
TRIGL SERPL-MCNC: 51 MG/DL
TSH SERPL DL<=0.005 MIU/L-ACNC: 0.74 UIU/ML (ref 0.3–4.2)

## 2024-12-16 PROCEDURE — 91320 SARSCV2 VAC 30MCG TRS-SUC IM: CPT | Performed by: FAMILY MEDICINE

## 2024-12-16 PROCEDURE — 90471 IMMUNIZATION ADMIN: CPT | Performed by: FAMILY MEDICINE

## 2024-12-16 PROCEDURE — 80053 COMPREHEN METABOLIC PANEL: CPT | Performed by: FAMILY MEDICINE

## 2024-12-16 PROCEDURE — 80061 LIPID PANEL: CPT | Performed by: FAMILY MEDICINE

## 2024-12-16 PROCEDURE — 90480 ADMN SARSCOV2 VAC 1/ONLY CMP: CPT | Performed by: FAMILY MEDICINE

## 2024-12-16 PROCEDURE — 99395 PREV VISIT EST AGE 18-39: CPT | Mod: 25 | Performed by: FAMILY MEDICINE

## 2024-12-16 PROCEDURE — 84443 ASSAY THYROID STIM HORMONE: CPT | Performed by: FAMILY MEDICINE

## 2024-12-16 PROCEDURE — 90656 IIV3 VACC NO PRSV 0.5 ML IM: CPT | Performed by: FAMILY MEDICINE

## 2024-12-16 PROCEDURE — 82728 ASSAY OF FERRITIN: CPT | Performed by: FAMILY MEDICINE

## 2024-12-16 PROCEDURE — 36415 COLL VENOUS BLD VENIPUNCTURE: CPT | Performed by: FAMILY MEDICINE

## 2024-12-16 SDOH — HEALTH STABILITY: PHYSICAL HEALTH: ON AVERAGE, HOW MANY DAYS PER WEEK DO YOU ENGAGE IN MODERATE TO STRENUOUS EXERCISE (LIKE A BRISK WALK)?: 4 DAYS

## 2024-12-16 ASSESSMENT — ANXIETY QUESTIONNAIRES
3. WORRYING TOO MUCH ABOUT DIFFERENT THINGS: SEVERAL DAYS
7. FEELING AFRAID AS IF SOMETHING AWFUL MIGHT HAPPEN: NOT AT ALL
IF YOU CHECKED OFF ANY PROBLEMS ON THIS QUESTIONNAIRE, HOW DIFFICULT HAVE THESE PROBLEMS MADE IT FOR YOU TO DO YOUR WORK, TAKE CARE OF THINGS AT HOME, OR GET ALONG WITH OTHER PEOPLE: NOT DIFFICULT AT ALL
6. BECOMING EASILY ANNOYED OR IRRITABLE: NOT AT ALL
8. IF YOU CHECKED OFF ANY PROBLEMS, HOW DIFFICULT HAVE THESE MADE IT FOR YOU TO DO YOUR WORK, TAKE CARE OF THINGS AT HOME, OR GET ALONG WITH OTHER PEOPLE?: NOT DIFFICULT AT ALL
1. FEELING NERVOUS, ANXIOUS, OR ON EDGE: NOT AT ALL
GAD7 TOTAL SCORE: 1
GAD7 TOTAL SCORE: 1
7. FEELING AFRAID AS IF SOMETHING AWFUL MIGHT HAPPEN: NOT AT ALL
2. NOT BEING ABLE TO STOP OR CONTROL WORRYING: NOT AT ALL
5. BEING SO RESTLESS THAT IT IS HARD TO SIT STILL: NOT AT ALL
4. TROUBLE RELAXING: NOT AT ALL
GAD7 TOTAL SCORE: 1

## 2024-12-16 ASSESSMENT — PAIN SCALES - GENERAL: PAINLEVEL_OUTOF10: NO PAIN (0)

## 2024-12-16 ASSESSMENT — PATIENT HEALTH QUESTIONNAIRE - PHQ9
SUM OF ALL RESPONSES TO PHQ QUESTIONS 1-9: 1
SUM OF ALL RESPONSES TO PHQ QUESTIONS 1-9: 1
10. IF YOU CHECKED OFF ANY PROBLEMS, HOW DIFFICULT HAVE THESE PROBLEMS MADE IT FOR YOU TO DO YOUR WORK, TAKE CARE OF THINGS AT HOME, OR GET ALONG WITH OTHER PEOPLE: NOT DIFFICULT AT ALL

## 2024-12-16 ASSESSMENT — SOCIAL DETERMINANTS OF HEALTH (SDOH): HOW OFTEN DO YOU GET TOGETHER WITH FRIENDS OR RELATIVES?: MORE THAN THREE TIMES A WEEK

## 2024-12-16 NOTE — NURSING NOTE
Prior to immunization administration, verified patients identity using patient s name and date of birth. Please see Immunization Activity for additional information.     Screening Questionnaire for Adult Immunization    Are you sick today?   No   Do you have allergies to medications, food, a vaccine component or latex?   No   Have you ever had a serious reaction after receiving a vaccination?   No   Do you have a long-term health problem with heart, lung, kidney, or metabolic disease (e.g., diabetes), asthma, a blood disorder, no spleen, complement component deficiency, a cochlear implant, or a spinal fluid leak?  Are you on long-term aspirin therapy?   No   Do you have cancer, leukemia, HIV/AIDS, or any other immune system problem?   No   Do you have a parent, brother, or sister with an immune system problem?   No   In the past 3 months, have you taken medications that affect  your immune system, such as prednisone, other steroids, or anticancer drugs; drugs for the treatment of rheumatoid arthritis, Crohn s disease, or psoriasis; or have you had radiation treatments?   No   Have you had a seizure, or a brain or other nervous system problem?   No   During the past year, have you received a transfusion of blood or blood    products, or been given immune (gamma) globulin or antiviral drug?   No   For women: Are you pregnant or is there a chance you could become       pregnant during the next month?   No   Have you received any vaccinations in the past 4 weeks?   No     Immunization questionnaire answers were all negative.      Patient instructed to remain in clinic for 15 minutes afterwards, and to report any adverse reactions.     Screening performed by Emanuel Martinez MA on 12/16/2024 at 11:13 AM.

## 2024-12-16 NOTE — PROGRESS NOTES
Answers submitted by the patient for this visit:  Patient Health Questionnaire (Submitted on 12/16/2024)  If you checked off any problems, how difficult have these problems made it for you to do your work, take care of things at home, or get along with other people?: Not difficult at all  PHQ9 TOTAL SCORE: 1  Patient Health Questionnaire (G7) (Submitted on 12/16/2024)  JONATAN 7 TOTAL SCORE: 1  Preventive Care Visit  [facility]  Kika Zhou MD, Family Medicine  Dec 16, 2024  {Provider  Link to [employer] :031421}    {PROVIDER CHARTING PREFERENCE:317666}    Mikhail Hinton is a 34 year old, presenting for the following:  Physical        12/16/2024     9:44 AM   Additional Questions   Roomed by rakesh ohara   Accompanied by n.a         12/16/2024     9:44 AM   Patient Reported Additional Medications   Patient reports taking the following new medications n/a          HPI  ***GYN CYTOLOGY  Specimen: PAP SMEAR, LIQUID BASED, CERVICAL - PAP SMEAR, LIQUID BASED, CERVICAL  Component 3 yr ago   PATHOLOGY REPORT Provider:  JANENE NUÑEZ M.D.  Collected: 5/3/2021                    Case #:  [id number]                         Gynecologic Cytology Report    HPV Result: HPV16/18 NEGATIVE, SOR53-cvpqn NEGATIVE    FINAL PATHOLOGIC DIAGNOSIS  A:  CERVICAL LIQUID-BASED PAP -    NEGATIVE FOR INTRAEPITHELIAL LESION OR MALIGNANCY.    ENDOCERVICAL/TRANSFORMATION ZONE COMPONENT PRESENT.    SATISFACTORY FOR EVALUATION.                               ATUL Reinoso (ASCP)                           ** Report Electronically Signed by INTF **      Patient Postcard Notification Preference: Y  Postcard Sent:  Y    Clinical History  Date of Last Menstrual Period: No LMP recorded  PREVIOUS ABNORMAL PAP DATE (if not last pap date):  No Date  Provided  CURRENT INFECTION:  No Infection Provided    HPV HISTORY:  No History Provided    OTHER PERTINENT CLINICAL INFORMATION:  No Data Provided    Specimen(s)  Received  A:CERVICAL LIQUID-BASED PAP      Patient Name:  VICKIE KATHLEEN  [facility] Rec #:  [id number]    /Age:  1990 (Age: 30)   Sex:  F  Facility:  [facility]  Location:  OBSTETRICS AND GYNECOLOGY    [facility]  [address] [place] [address]  Jessy Figueroa M.D.       {MA/LPN/RN Pre-Provider Visit Orders- hCG/UA/Strep (Optional):158662}  {SUPERLIST (Optional):896290}  {additonal problems for provider to add (Optional):514441}  Health Care Directive  Patient does not have a Health Care Directive: Discussed advance care planning with patient; information given to patient to review.      2024   General Health   How would you rate your overall physical health? Good   Feel stress (tense, anxious, or unable to sleep) Only a little      (!) STRESS CONCERN      2024   Nutrition   Three or more servings of calcium each day? Yes   Diet: Regular (no restrictions)   How many servings of fruit and vegetables per day? (!) 2-3   How many sweetened beverages each day? 0-1            2024   Exercise   Days per week of moderate/strenous exercise 4 days            2024   Social Factors   Frequency of gathering with friends or relatives More than three times a week   Worry food won't last until get money to buy more No   Food not last or not have enough money for food? No   Do you have housing? (Housing is defined as stable permanent housing and does not include staying ouside in a car, in a tent, in an abandoned building, in an overnight shelter, or couch-surfing.) Yes   Are you worried about losing your housing? No   Lack of transportation? No   Unable to get utilities (heat,electricity)? No            2024   Dental   Dentist two times every year? (!) NO            2024   TB Screening   Were you born outside of the [place]? No          Today's PHQ-9 Score:       2024     9:42 AM   PHQ-9 SCORE   PHQ-9 Total Score MyChart 1  "(Minimal depression)   PHQ-9 Total Score 1        Patient-reported         12/16/2024   Substance Use   Alcohol more than 3/day or more than 7/wk No   Do you use any other substances recreationally? No        Social History     Tobacco Use    Smoking status: Never    Smokeless tobacco: Never   Vaping Use    Vaping status: Never Used   Substance Use Topics    Alcohol use: Yes     Comment: occ    Drug use: Never     {Provider  If there are gaps in the social history shown above, please follow the link to update and then refresh the note Link to Social and Substance History :799075}     {Mammogram Decision Support (Optional):502334}          12/16/2024   One time HIV Screening   Previous HIV test? No          12/16/2024   STI Screening   New sexual partner(s) since last STI/HIV test? No        History of abnormal Pap smear: { :959715}             12/16/2024   Contraception/Family Planning   Questions about contraception or family planning No        {Provider  REQUIRED FOR AWV Use the storyboard to review patient history, after sections have been marked as reviewed, refresh note to capture documentation:379407}   Reviewed and updated as needed this visit by Provider                    {HISTORY OPTIONS (Optional):903533}    {ROS Picklists (Optional):147316}     Objective    Exam  There were no vitals taken for this visit.   Estimated body mass index is 21.98 kg/m  as calculated from the following:    Height as of 8/3/23: 1.62 m (5' 3.78\").    Weight as of 8/3/23: 57.7 kg (127 lb 3.2 oz).    Physical Exam  {Exam Choices (Optional):174350}        Signed Electronically by: Kika Zhou MD  {Email feedback regarding this note to [email or web address]   :372660}  " "noted.     Objective    Exam  /62 (BP Location: Left arm, Patient Position: Sitting, Cuff Size: Adult Regular)   Pulse 74   Temp 96.9  F (36.1  C) (Temporal)   Resp 19   Wt 56.2 kg (124 lb)   LMP 11/25/2024   SpO2 98%   BMI 21.43 kg/m     Estimated body mass index is 21.43 kg/m  as calculated from the following:    Height as of 8/3/23: 1.62 m (5' 3.78\").    Weight as of this encounter: 56.2 kg (124 lb).    Physical Exam  GENERAL: alert and no distress  EYES: Eyes grossly normal to inspection, PERRL and conjunctivae and sclerae normal  HENT: ear canals and TM's normal, nose and mouth without ulcers or lesions  NECK: no adenopathy, no asymmetry, masses, or scars  RESP: lungs clear to auscultation - no rales, rhonchi or wheezes  CV: regular rate and rhythm, normal S1 S2, no S3 or S4, no murmur, click or rub, no peripheral edema  ABDOMEN: soft, nontender, no hepatosplenomegaly, no masses and bowel sounds normal  MS: no gross musculoskeletal defects noted, no edema  SKIN: no suspicious lesions or rashes  NEURO: Normal strength and tone, mentation intact and speech normal  PSYCH: mentation appears normal, affect normal/bright        Signed Electronically by: Kika Zhou MD    "

## 2024-12-16 NOTE — PROGRESS NOTES
Answers submitted by the patient for this visit:  Patient Health Questionnaire (Submitted on 12/16/2024)  If you checked off any problems, how difficult have these problems made it for you to do your work, take care of things at home, or get along with other people?: Not difficult at all  PHQ9 TOTAL SCORE: 1  Patient Health Questionnaire (G7) (Submitted on 12/16/2024)  JONATAN 7 TOTAL SCORE: 1  Preventive Care Visit  [facility] Crystal Zhou MD, Family Medicine  Dec 16, 2024  {Provider  Link to [employer] :769815}    Assessment & Plan     Well adult exam  ***  - Ferritin  - Lipid panel reflex to direct LDL Non-fasting  - TSH with free T4 reflex  - Comprehensive metabolic panel (BMP + Alb, Alk Phos, ALT, AST, Total. Bili, TP)  - Ferritin  - Lipid panel reflex to direct LDL Non-fasting  - TSH with free T4 reflex  - Comprehensive metabolic panel (BMP + Alb, Alk Phos, ALT, AST, Total. Bili, TP)    Cervical cancer screening  ***    Screening for HIV (human immunodeficiency virus)  ***    Need for hepatitis C screening test  ***    Fatigue, unspecified type  ***  - Ferritin  - TSH with free T4 reflex  - Comprehensive metabolic panel (BMP + Alb, Alk Phos, ALT, AST, Total. Bili, TP)  - Ferritin  - TSH with free T4 reflex  - Comprehensive metabolic panel (BMP + Alb, Alk Phos, ALT, AST, Total. Bili, TP)      Patient has been advised of split billing requirements and indicates understanding: Yes        Counseling  Appropriate preventive services were addressed with this patient via screening, questionnaire, or discussion as appropriate for fall prevention, nutrition, physical activity, Tobacco-use cessation, social engagement, weight loss and cognition.  Checklist reviewing preventive services available has been given to the patient.  Reviewed patient's diet, addressing concerns and/or questions.   The patient was instructed to see the dentist every 6 months.       {FOLLOW UP PLANS (Optional) Includes  COVID19 Treatment Plan:248622}    Subjective   Mary Jane is a 34 year old, presenting for the following:  Physical        12/16/2024     9:44 AM   Additional Questions   Roomed by rakesh ohara   Accompanied by leonel         12/16/2024     9:44 AM   Patient Reported Additional Medications   Patient reports taking the following new medications n/a          HPI    Mary Jane is a 34 year old female with no PMH who presents for her physical.     HME  - Papsmear record from 05/03/2021, no history of abnormal papsmears.   - Requesting influenza and COVID-19 vaccinations.  - Establishing dental care in the new year, just got dental insurance.   -     UTI  Reports recurrent UTI's about 2 per year. Last culture 11/18/2024 showed group B strep. 01/13/2023 UC showed E coli.   Placed order for standing UA for patient if symptoms present. Discussed postcoital prophylactic antibiotic treatment.       See pap results from 05/03/2021 below  GYN CYTOLOGY  Specimen: PAP SMEAR, LIQUID BASED, CERVICAL - PAP SMEAR, LIQUID BASED, CERVICAL  Component 3 yr ago   PATHOLOGY REPORT Provider:  JANENE NUÑEZ M.D.  Collected: 5/3/2021                    Case #:  [id number]                         Gynecologic Cytology Report    HPV Result: HPV16/18 NEGATIVE, KMM10-gazxs NEGATIVE    FINAL PATHOLOGIC DIAGNOSIS  A:  CERVICAL LIQUID-BASED PAP -    NEGATIVE FOR INTRAEPITHELIAL LESION OR MALIGNANCY.    ENDOCERVICAL/TRANSFORMATION ZONE COMPONENT PRESENT.    SATISFACTORY FOR EVALUATION.                               ATUL Reinoso (ASCP)                           ** Report Electronically Signed by INTF **      Patient Postcard Notification Preference: Y  Postcard Sent:  Y    Clinical History  Date of Last Menstrual Period: No LMP recorded  PREVIOUS ABNORMAL PAP DATE (if not last pap date):  No Date  Provided  CURRENT INFECTION:  No Infection Provided    HPV HISTORY:  No History Provided    OTHER PERTINENT CLINICAL INFORMATION:  No Data  Provided    Specimen(s) Received  A:CERVICAL LIQUID-BASED PAP      Patient Name:  VICKIE KATHLEEN Rec #:  [id number]    /Age:  1990 (Age: 30)   Sex:  F  Facility:  [facility]  Location:  OBSTETRICS AND GYNECOLOGY    [facility]  [address] [place] [address]  Jessy Figueroa M.D.       {MA/LPN/RN Pre-Provider Visit Orders- hCG/UA/Strep (Optional):933114}  {SUPERLIST (Optional):143215}  {additonal problems for provider to add (Optional):670214}  Health Care Directive  Patient does not have a Health Care Directive: Discussed advance care planning with patient; information given to patient to review.      2024   General Health   How would you rate your overall physical health? Good   Feel stress (tense, anxious, or unable to sleep) Only a little      (!) STRESS CONCERN      2024   Nutrition   Three or more servings of calcium each day? Yes   Diet: Regular (no restrictions)   How many servings of fruit and vegetables per day? (!) 2-3   How many sweetened beverages each day? 0-1            2024   Exercise   Days per week of moderate/strenous exercise 4 days            2024   Social Factors   Frequency of gathering with friends or relatives More than three times a week   Worry food won't last until get money to buy more No   Food not last or not have enough money for food? No   Do you have housing? (Housing is defined as stable permanent housing and does not include staying ouside in a car, in a tent, in an abandoned building, in an overnight shelter, or couch-surfing.) Yes   Are you worried about losing your housing? No   Lack of transportation? No   Unable to get utilities (heat,electricity)? No            2024   Dental   Dentist two times every year? (!) NO            2024   TB Screening   Were you born outside of the [place]? No          Today's PHQ-9 Score:       2024     9:42 AM   PHQ-9 SCORE   PHQ-9  "Total Score MyChart 1 (Minimal depression)   PHQ-9 Total Score 1        Patient-reported         12/16/2024   Substance Use   Alcohol more than 3/day or more than 7/wk No   Do you use any other substances recreationally? No        Social History     Tobacco Use     Smoking status: Never     Smokeless tobacco: Never   Vaping Use     Vaping status: Never Used   Substance Use Topics     Alcohol use: Yes     Comment: occ     Drug use: Never     {Provider  If there are gaps in the social history shown above, please follow the link to update and then refresh the note Link to Social and Substance History :152734}     {Mammogram Decision Support (Optional):282296}          12/16/2024   One time HIV Screening   Previous HIV test? No          12/16/2024   STI Screening   New sexual partner(s) since last STI/HIV test? No        History of abnormal Pap smear: { :384370}             12/16/2024   Contraception/Family Planning   Questions about contraception or family planning No        {Provider  REQUIRED FOR AWV Use the storyboard to review patient history, after sections have been marked as reviewed, refresh note to capture documentation:533267}   Reviewed and updated as needed this visit by Provider                    {HISTORY OPTIONS (Optional):773664}    {ROS Picklists (Optional):371634}     Objective    Exam  There were no vitals taken for this visit.   Estimated body mass index is 21.98 kg/m  as calculated from the following:    Height as of 8/3/23: 1.62 m (5' 3.78\").    Weight as of 8/3/23: 57.7 kg (127 lb 3.2 oz).    Physical Exam  {Exam Choices (Optional):149685}    I was present with the P student, Courtney Cedillo RN, who participated in the service and in the documentation of the services provided. I have verified the history and personally performed the physical exam and medical decision making, as documented by the student and edited by me    Courtney Cedillo  12/16/24  1:31 PM      Signed Electronically by: " Kika Zhou MD  {Email feedback regarding this note to [email or web address]   :431429}

## 2024-12-23 NOTE — RESULT ENCOUNTER NOTE
Hello,    Your results were normal.  The cholesterol thyroid levels were excellent.  Comprehensive panel was all normal.    The ferritin was low end of normal.  For this I do recommend taking an iron supplement ferrous gluconate or ferrous sulfate are great options for this and if you take an iron supplement every other day with orange juice you will boost absorption.  Perhaps see if you can do this and then we can Mandy repeat ferritin levels in 2 to 3 months to see how you are doing.  You can still eat iron rich foods and that is a good thing to do to help boost levels as well.    Kika Zhou MD

## 2025-02-05 ENCOUNTER — E-VISIT (OUTPATIENT)
Dept: URGENT CARE | Facility: CLINIC | Age: 35
End: 2025-02-05
Payer: COMMERCIAL

## 2025-02-05 DIAGNOSIS — R30.0 DYSURIA: Primary | ICD-10-CM

## 2025-02-05 NOTE — PATIENT INSTRUCTIONS
Dear Kathryn Tarango,     After reviewing your responses, I would like you to come in for a urine test to make sure we treat you correctly. This urine test is to evaluate you for a possible urinary tract infection, and should be scheduled for today or tomorrow. Schedule a Lab Only appointment here.     Lab appointments are not available at most locations on the weekends. If no Lab Only appointment is available, you should be seen in any of our convenient Walk-in or Urgent Care Centers, which can be found on our website here.     You will receive instructions with your results in [employer] (deja mi) once they are available.     If your symptoms worsen, you develop pain in your back or stomach, develop fevers, or are not improving in 5 days, please contact your primary care provider for an appointment or visit a Walk-in or Urgent Care Center to be seen.     Thanks again for choosing us as your health care partner,     MANDIE GASTON CNP

## 2025-02-26 ENCOUNTER — OFFICE VISIT (OUTPATIENT)
Dept: URGENT CARE | Facility: URGENT CARE | Age: 35
End: 2025-02-26
Payer: COMMERCIAL

## 2025-02-26 VITALS
TEMPERATURE: 98.2 F | WEIGHT: 127 LBS | RESPIRATION RATE: 18 BRPM | BODY MASS INDEX: 21.95 KG/M2 | DIASTOLIC BLOOD PRESSURE: 73 MMHG | OXYGEN SATURATION: 95 % | HEART RATE: 98 BPM | SYSTOLIC BLOOD PRESSURE: 101 MMHG

## 2025-02-26 DIAGNOSIS — J06.9 VIRAL URI WITH COUGH: Primary | ICD-10-CM

## 2025-02-26 DIAGNOSIS — R07.89 CHEST WALL PAIN: ICD-10-CM

## 2025-02-26 PROCEDURE — 99213 OFFICE O/P EST LOW 20 MIN: CPT | Performed by: NURSE PRACTITIONER

## 2025-02-26 PROCEDURE — 3078F DIAST BP <80 MM HG: CPT | Performed by: NURSE PRACTITIONER

## 2025-02-26 PROCEDURE — 3074F SYST BP LT 130 MM HG: CPT | Performed by: NURSE PRACTITIONER

## 2025-02-26 NOTE — PROGRESS NOTES
"  ICD-10-CM    1. Viral URI with cough  J06.9       2. Chest wall pain  R07.89       Patient reports symptoms have actually improved from last week to this week but was concerned because of the increasing pain she is having with breathing and coughing.  Reassurance provided.  No evidence of pneumonia.  She will take Tylenol and ibuprofen as needed for pain. Rest.  Fluids.  Delsym for cough suppression at night.  Mucinex as desired during the day.  Recheck in 10 days if symptoms have not improved, sooner if they worsen.     Red flag warning signs and when to go to the emergency room discussed.  Reviewed potential adverse reactions to medications.    SUBJECTIVE:   Kathryn Tarango is a 34 year old female presenting with a chief complaint of   Chief Complaint   Patient presents with    Urgent Care    Cough     X 3 weeks persistent cough, achy ribs from coughing, wheezing   Also complains of feeling like something stuck in throat x 1 week \"feels like popcorn kernel stuck in throat\", denies sore throat -more like a tickle  Post nasal drip   Negative at home Covid test, 4-5 days ago   Denies fever     Post nasal drip that started a few days ago.      Review of systems is negative except for as noted in the HPI.    OBJECTIVE  /73 (BP Location: Left arm, Patient Position: Sitting, Cuff Size: Adult Regular)   Pulse 98   Temp 98.2  F (36.8  C) (Tympanic)   Resp 18   Wt 57.6 kg (127 lb)   LMP 02/18/2025 (Exact Date)   SpO2 95%   Breastfeeding No   BMI 21.95 kg/m        GENERAL: Alert, mild distress  SKIN: skin is clear, no rash or abnormal pigmentation  HEAD: The head is normocephalic.   EYES: The eyes are normal. The conjunctivae and cornea normal.   NECK: The neck is supple and thyroid is normal, no masses; LYMPH NODES: No adenopathy  HENT: Bilateral tympanic membranes and canals appear normal, nasal passages are clear, pharynx appears normal  LUNGS: The lung fields are clear to auscultation, no rales, rhonchi, " wheezing or retractions  CV: Rhythm is regular. S1 and S2 are normal. No murmurs.  EXTREMITIES: Symmetric extremities no deformities    MANDIE Carvalho, CNP  [facility] Provider    The use of Dragon/QUALIA (formerly known as [employer]) dictation services may have been used to construct the content in this note; any grammatical or spelling errors are non-intentional. Please contact the author of this note directly if you are in need of any clarification.

## 2025-02-26 NOTE — PROGRESS NOTES
"Assessment & Plan     No diagnosis found.     ***  MDM:     There are no Patient Instructions on file for this visit.    Follow up with primary care provider with any problems, questions or concerns or if symptoms worsen or fail to improve. Patient agreed to the treatment plan and verbalized understanding.     No results found for this or any previous visit (from the past 24 hours).    Subjective     Mary Jane is a 34 year old female who presents to clinic today with *** for the following health issues:  Chief Complaint   Patient presents with    Urgent Care    Cough     X 3 weeks persistent cough, achy ribs from coughing, wheezing   Also complains of feeling like something stuck in throat x 1 week \"feels like popcorn kernel stuck in throat\", denies sore throat -more like a tickle  Post nasal drip   Negative at home Covid test, 4-5 days ago   Denies fever      HPI        Review of Systems    Problem List:  2023-07: Bilateral low back pain without sciatica  2023-07: Diastasis of rectus abdominis      No past medical history on file.      Social History     Tobacco Use    Smoking status: Never     Passive exposure: Never    Smokeless tobacco: Never   Substance Use Topics    Alcohol use: Yes     Comment: occ           Objective    /73 (BP Location: Left arm, Patient Position: Sitting, Cuff Size: Adult Regular)   Pulse 98   Temp 98.2  F (36.8  C) (Tympanic)   Resp 18   Wt 57.6 kg (127 lb)   LMP 02/18/2025 (Exact Date)   SpO2 95%   Breastfeeding No   BMI 21.95 kg/m    Physical Exam         Amber Jacques NP    "

## 2025-02-26 NOTE — PATIENT INSTRUCTIONS
Try Benzonatate cough medication.    Ibuprofen 3 tablets every 8 hours with a snack for 4-7 days    Viral Respiratory Infection: Care Instructions  Overview     A viral respiratory infection is an infection of the nose, sinuses, or throat caused by a virus. Colds and the flu are common types of viral respiratory infections.  The symptoms of a viral respiratory infection often start quickly. They include a fever, sore throat, and runny nose. You may also just not feel well. Or you may not want to eat much.  Most viral infections can be treated with home care. This may include drinking lots of fluids and taking over-the-counter pain medicine. You will probably feel better in 4 to 10 days.  Antibiotics are not used to treat a viral infection. Antibiotics don't kill viruses, so they won't help cure a viral illness.  In some cases, a doctor may prescribe antiviral medicine to help your body fight a serious viral infection.  Follow-up care is a key part of your treatment and safety. Be sure to make and go to all appointments, and call your doctor if you are having problems. It's also a good idea to know your test results and keep a list of the medicines you take.  How can you care for yourself at home?  To prevent dehydration, drink plenty of fluids. Choose water and other clear liquids until you feel better. If you have kidney, heart, or liver disease and have to limit fluids, talk with your doctor before you increase the amount of fluids you drink.  Ask your doctor if you can take an over-the-counter pain medicine, such as acetaminophen (Tylenol), ibuprofen (Advil, Motrin), or naproxen (Aleve). Be safe with medicines. Read and follow all instructions on the label. No one younger than 20 should take aspirin. It has been linked to Reye syndrome, a serious illness.  Be careful when taking over-the-counter cold or flu medicines and Tylenol at the same time. Many of these medicines have acetaminophen, which is Tylenol. Read  the labels to make sure that you are not taking more than the recommended dose. Too much acetaminophen (Tylenol) can be harmful.  Get plenty of rest.  Use saline (saltwater) nasal washes to help keep your nasal passages open and wash out mucus and allergens. You can buy saline nose sprays at a grocery store or drugstore. Follow the instructions on the package. Or you can make your own at home. Add 1 teaspoon of non-iodized salt and 1 teaspoon of baking soda to 2 cups of distilled or boiled and cooled water. Fill a squeeze bottle or neti pot with the nasal wash. Then put the tip into your nostril, and lean over the sink. With your mouth open, gently squirt the liquid. Repeat on the other side.  Use a vaporizer or humidifier to add moisture to your bedroom. Follow the instructions for cleaning the machine.  Do not smoke or allow others to smoke around you. If you need help quitting, talk to your doctor about stop-smoking programs and medicines. These can increase your chances of quitting for good.  When should you call for help?   Call 911 anytime you think you may need emergency care. For example, call if:    You have severe trouble breathing.   Call your doctor now or seek immediate medical care if:    You have a new or higher fever.     Your fever lasts more than 48 hours.     You have trouble breathing.     You have a fever with a stiff neck or a severe headache.     You are sensitive to light.     You feel very sleepy or confused.   Watch closely for changes in your health, and be sure to contact your doctor if:    You do not get better as expected.

## 2025-02-27 ENCOUNTER — E-VISIT (OUTPATIENT)
Dept: URGENT CARE | Facility: CLINIC | Age: 35
End: 2025-02-27
Payer: COMMERCIAL

## 2025-02-27 DIAGNOSIS — R11.10 VOMITING, UNSPECIFIED VOMITING TYPE, UNSPECIFIED WHETHER NAUSEA PRESENT: Primary | ICD-10-CM

## 2025-02-28 NOTE — PATIENT INSTRUCTIONS
Dear Kathryn Tarango,    We are sorry you are not feeling well. Based on the responses you provided, it is recommended that you be seen in-person in urgent care so we can better evaluate your symptoms. Please click here to find the nearest urgent care location to you.   You will not be charged for this Visit. Thank you for trusting us with your care.  If your headache and vomiting is severe especially with neck stiffness, I recommend that you be evaluated in the Emergency Room.  Alyson Lee MD

## 2025-03-23 ENCOUNTER — E-VISIT (OUTPATIENT)
Dept: URGENT CARE | Facility: CLINIC | Age: 35
End: 2025-03-23
Payer: COMMERCIAL

## 2025-03-23 DIAGNOSIS — J01.90 ACUTE SINUSITIS WITH SYMPTOMS > 10 DAYS: Primary | ICD-10-CM

## 2025-03-23 PROCEDURE — 99207 PR NON-BILLABLE SERV PER CHARTING: CPT | Performed by: NURSE PRACTITIONER

## 2025-03-23 NOTE — PATIENT INSTRUCTIONS
Acute Sinusitis: Care Instructions  Overview     Acute sinusitis is an inflammation of the mucous membranes inside the nose and sinuses. Sinuses are the hollow spaces in your skull around the eyes and nose. Acute sinusitis often follows a cold. Acute sinusitis causes thick, discolored mucus that drains from the nose or down the back of the throat. It also can cause pain and pressure in your head and face along with a stuffy or blocked nose.  In most cases, sinusitis gets better on its own in 1 to 2 weeks. But some mild symptoms may last for several weeks. Sometimes antibiotics are needed if there is a bacterial infection.  Follow-up care is a key part of your treatment and safety. Be sure to make and go to all appointments, and call your doctor if you are having problems. It's also a good idea to know your test results and keep a list of the medicines you take.  How can you care for yourself at home?  Use saline (saltwater) nasal washes. This can help keep your nasal passages open and wash out mucus and allergens.  You can buy saline nose washes at a grocery store or drugstore. Follow the instructions on the package.  You can make your own at home. Add 1 teaspoon of non-iodized salt and 1 teaspoon of baking soda to 2 cups of distilled or boiled and cooled water. Fill a squeeze bottle or a nasal cleansing pot (such as a neti pot) with the nasal wash. Then put the tip into your nostril, and lean over the sink. With your mouth open, gently squirt the liquid. Repeat on the other side.  Try a decongestant nasal spray like oxymetazoline (Afrin). Do not use it for more than 3 days in a row. Using it for more than 3 days can make your congestion worse.  If needed, take an over-the-counter pain medicine, such as acetaminophen (Tylenol), ibuprofen (Advil, Motrin), or naproxen (Aleve). Read and follow all instructions on the label.  If the doctor prescribed antibiotics, take them as directed. Do not stop taking them just  "because you feel better. You need to take the full course of antibiotics.  Be careful when taking over-the-counter cold or flu medicines and Tylenol at the same time. Many of these medicines have acetaminophen, which is Tylenol. Read the labels to make sure that you are not taking more than the recommended dose. Too much acetaminophen (Tylenol) can be harmful.  Try a steroid nasal spray. It may help with your symptoms.  Breathe warm, moist air. You can use a steamy shower, a hot bath, or a sink filled with hot water. Avoid cold, dry air. Using a humidifier in your home may help. Follow the directions for cleaning the machine.  When should you call for help?   Call your doctor now or seek immediate medical care if:    You have new or worse swelling, redness, or pain in your face or around one or both of your eyes.     You have double vision or a change in your vision.     You have a high fever.     You have a severe headache and a stiff neck.     You have mental changes, such as feeling confused or much less alert.   Watch closely for changes in your health, and be sure to contact your doctor if:    You are not getting better as expected.   Where can you learn more?  Go to https://www.[employer].net/patiented  Enter I933 in the search box to learn more about \"Acute Sinusitis: Care Instructions.\"  Current as of: October 27, 2024  Content Version: 14.4    0563-4428 [employer].   Care instructions adapted under license by your healthcare professional. If you have questions about a medical condition or this instruction, always ask your healthcare professional. [employer] disclaims any warranty or liability for your use of this information.    Dear Kathryn Tarango    After reviewing your responses, I've been able to diagnose you with Acute sinusitis with symptoms > 10 days.      Based on your responses and diagnosis, I have prescribed   Orders Placed This Encounter   Medications     " amoxicillin-clavulanate (AUGMENTIN) 875-125 MG tablet     Sig: Take 1 tablet by mouth 2 times daily for 7 days.     Dispense:  14 tablet     Refill:  0      to treat your symptoms. I have sent this to your pharmacy.?     It is also important to stay well hydrated, get lots of rest and take over-the-counter decongestants,?tylenol?or ibuprofen if you?are able to?take those medications per your primary care provider to help relieve discomfort.?     It is important that you take?all of?your prescribed medication even if your symptoms are improving after a few doses.? Taking?all of?your medicine helps prevent the symptoms from returning.?     If your symptoms worsen, you develop severe headache, vomiting, high fever (>102), or are not improving in 7 days, please contact your primary care provider for an appointment or visit any of our convenient Walk-in Care or Urgent Care Centers to be seen which can be found on our website?here.?     Thanks again for choosing?us?as your health care partner,?   ?  MANDIE GASTON CNP?   Thank you for choosing us for your care. I have placed an order for a prescription so that you can start treatment:  Orders Placed This Encounter   Medications     amoxicillin-clavulanate (AUGMENTIN) 875-125 MG tablet     Sig: Take 1 tablet by mouth 2 times daily for 7 days.     Dispense:  14 tablet     Refill:  0          View your full visit summary for details by clicking on the link below. Your pharmacist will able to address any questions you may have about the medication.     If you're not feeling better within 5-7 days, please schedule an appointment.  You can schedule an appointment right here in [facility], or call [phone]  If the visit is for the same symptoms as your eVisit, we'll refund the cost of your eVisit if seen within seven days.

## 2025-07-07 ENCOUNTER — E-VISIT (OUTPATIENT)
Dept: URGENT CARE | Facility: CLINIC | Age: 35
End: 2025-07-07
Payer: COMMERCIAL

## 2025-07-07 DIAGNOSIS — N39.0 ACUTE UTI (URINARY TRACT INFECTION): Primary | ICD-10-CM

## 2025-07-07 RX ORDER — NITROFURANTOIN 25; 75 MG/1; MG/1
100 CAPSULE ORAL 2 TIMES DAILY
Qty: 10 CAPSULE | Refills: 0 | Status: SHIPPED | OUTPATIENT
Start: 2025-07-07 | End: 2025-07-12

## 2025-07-08 NOTE — PATIENT INSTRUCTIONS
Dear Kathryn Tarango    After reviewing your responses, I've been able to diagnose you with a urinary tract infection, which is a common infection of the bladder with bacteria.  This is not a sexually transmitted infection, though urinating immediately after intercourse can help prevent infections.  Drinking lots of fluids is also helpful to clear your current infection and prevent the next one.      I have sent a prescription for antibiotics to your pharmacy to treat this infection.    It is important that you take all of your prescribed medication even if your symptoms are improving after a few doses.  Taking all of your medicine helps prevent the symptoms from returning.     If your symptoms worsen, you develop pain in your back or stomach, develop fevers, or are not improving in 5 days, please contact your primary care provider for an appointment or visit any of our convenient Walk-in or Urgent Care Centers to be seen, which can be found on our website here.    Thanks again for choosing us as your health care partner,    Donita Flaskerud, CNP  Female Urinary Tract Infection (UTI): Care Instructions  Overview    A urinary tract infection (UTI) is an infection caused by bacteria. It can happen anywhere in the urinary tract. A UTI can happen in the:  Kidneys.  Ureters, the tubes that connect the kidneys to the bladder.  Bladder.  Urethra, where the urine comes out.  Most UTIs are bladder infections. They often cause pain or burning when you urinate.  Most UTIs can be cured with antibiotics. If you are prescribed antibiotics, be sure to complete your treatment so that the infection does not get worse.  Follow-up care is a key part of your treatment and safety. Be sure to make and go to all appointments, and call your doctor if you are having problems. It's also a good idea to know your test results and keep a list of the medicines you take.  How can you care for yourself at home?  Take your antibiotics as  directed. Do not stop taking them just because you feel better. You need to take the full course of antibiotics.  Drink extra water and other fluids for the next day or two. This will help make the urine less concentrated and help wash out the bacteria that are causing the infection. (If you have kidney, heart, or liver disease and have to limit fluids, talk with your doctor before you increase the amount of fluids you drink.)  Avoid drinks that are carbonated or have caffeine. They can irritate the bladder.  Urinate often. Try to empty your bladder each time.  To relieve pain, try taking a warm bath in plain water or laying a heating pad set on low over your lower belly or genital area. Never go to sleep with a heating pad in place.  Avoid bubble baths and hygiene sprays, powders, or perfumes in the vagina or on the vulva. Do not douche. These things can irritate the urethra and may make symptoms worse.  To help prevent UTIs  Drink plenty of water each day. This helps you urinate often, which clears bacteria from your system. (If you have kidney, heart, or liver disease and have to limit fluids, talk with your doctor before you increase the amount of fluids you drink.)  Urinate when you need to.  If you are sexually active, urinate right after you have sex.  Change sanitary pads often.  After going to the bathroom, wipe from front to back.  When should you call for help?  Contact your doctor now or seek immediate medical care if:  Symptoms such as a fever, chills, nausea, or vomiting get worse or happen for the first time.  You have new pain in your back just below your rib cage. This is called flank pain.  There is new blood or pus in your urine.  You are not able to take or keep down your antibiotics.  Watch closely for changes in your health, and be sure to contact your doctor if:  You are not getting better after taking an antibiotic for 2 days.  Your symptoms go away but then come back.  Where can you learn  "more?  Go to https://[email or web address]/patiented  Enter K848 in the search box to learn more about \"Female Urinary Tract Infection (UTI): Care Instructions.\"  Current as of: April 30, 2024  Content Version: 14.5 2024-2025 [facility].   Care instructions adapted under license by your healthcare professional. If you have questions about a medical condition or this instruction, always ask your healthcare professional. [facility] disclaims any warranty or liability for your use of this information.    "

## 2025-09-03 ENCOUNTER — LAB (OUTPATIENT)
Dept: LAB | Facility: CLINIC | Age: 35
End: 2025-09-03
Payer: COMMERCIAL

## 2025-09-03 DIAGNOSIS — R53.83 FATIGUE, UNSPECIFIED TYPE: ICD-10-CM

## 2025-09-03 DIAGNOSIS — Z11.4 SCREENING FOR HIV (HUMAN IMMUNODEFICIENCY VIRUS): Primary | ICD-10-CM

## 2025-09-03 DIAGNOSIS — Z11.59 NEED FOR HEPATITIS C SCREENING TEST: ICD-10-CM

## 2025-09-03 LAB
ALBUMIN UR-MCNC: NEGATIVE MG/DL
APPEARANCE UR: CLEAR
BACTERIA #/AREA URNS HPF: ABNORMAL /HPF
BILIRUB UR QL STRIP: NEGATIVE
COLOR UR AUTO: YELLOW
GLUCOSE UR STRIP-MCNC: NEGATIVE MG/DL
HGB UR QL STRIP: NEGATIVE
KETONES UR STRIP-MCNC: NEGATIVE MG/DL
LEUKOCYTE ESTERASE UR QL STRIP: NEGATIVE
NITRATE UR QL: NEGATIVE
PH UR STRIP: 7 [PH] (ref 5–7)
RBC #/AREA URNS AUTO: ABNORMAL /HPF
SP GR UR STRIP: 1.01 (ref 1–1.03)
SQUAMOUS #/AREA URNS AUTO: ABNORMAL /LPF
UROBILINOGEN UR STRIP-ACNC: 0.2 E.U./DL
WBC #/AREA URNS AUTO: ABNORMAL /HPF

## 2025-09-03 PROCEDURE — 81001 URINALYSIS AUTO W/SCOPE: CPT

## 2025-09-04 ENCOUNTER — MYC MEDICAL ADVICE (OUTPATIENT)
Dept: FAMILY MEDICINE | Facility: CLINIC | Age: 35
End: 2025-09-04
Payer: COMMERCIAL